# Patient Record
Sex: MALE | Race: WHITE | Employment: FULL TIME | URBAN - METROPOLITAN AREA
[De-identification: names, ages, dates, MRNs, and addresses within clinical notes are randomized per-mention and may not be internally consistent; named-entity substitution may affect disease eponyms.]

---

## 2021-03-31 ENCOUNTER — OFFICE VISIT (OUTPATIENT)
Dept: FAMILY MEDICINE CLINIC | Age: 56
End: 2021-03-31

## 2021-03-31 VITALS
SYSTOLIC BLOOD PRESSURE: 150 MMHG | BODY MASS INDEX: 26.24 KG/M2 | TEMPERATURE: 98.8 F | WEIGHT: 198 LBS | HEIGHT: 73 IN | DIASTOLIC BLOOD PRESSURE: 110 MMHG | OXYGEN SATURATION: 96 % | HEART RATE: 91 BPM

## 2021-03-31 VITALS
HEART RATE: 89 BPM | TEMPERATURE: 97.1 F | HEIGHT: 73 IN | OXYGEN SATURATION: 97 % | DIASTOLIC BLOOD PRESSURE: 100 MMHG | WEIGHT: 192.38 LBS | BODY MASS INDEX: 25.5 KG/M2 | RESPIRATION RATE: 16 BRPM | SYSTOLIC BLOOD PRESSURE: 150 MMHG

## 2021-03-31 DIAGNOSIS — F17.210 TOBACCO DEPENDENCE DUE TO CIGARETTES: ICD-10-CM

## 2021-03-31 DIAGNOSIS — Z13.31 POSITIVE DEPRESSION SCREENING: ICD-10-CM

## 2021-03-31 DIAGNOSIS — Z53.20 PROCEDURE REFUSED: ICD-10-CM

## 2021-03-31 DIAGNOSIS — Z28.20 VACCINE REFUSED BY PATIENT: ICD-10-CM

## 2021-03-31 DIAGNOSIS — I10 ESSENTIAL HYPERTENSION: Primary | ICD-10-CM

## 2021-03-31 DIAGNOSIS — E66.3 OVERWEIGHT: ICD-10-CM

## 2021-03-31 DIAGNOSIS — F32.A ANXIETY AND DEPRESSION: ICD-10-CM

## 2021-03-31 DIAGNOSIS — F41.9 ANXIETY AND DEPRESSION: ICD-10-CM

## 2021-03-31 DIAGNOSIS — Z59.00 HOMELESS: ICD-10-CM

## 2021-03-31 DIAGNOSIS — Z53.20 COLON CANCER SCREENING DECLINED: ICD-10-CM

## 2021-03-31 PROCEDURE — 99213 OFFICE O/P EST LOW 20 MIN: CPT | Performed by: NURSE PRACTITIONER

## 2021-03-31 RX ORDER — VENLAFAXINE HYDROCHLORIDE 150 MG/1
150 CAPSULE, EXTENDED RELEASE ORAL DAILY
COMMUNITY

## 2021-03-31 RX ORDER — ALPRAZOLAM 0.25 MG/1
0.25 TABLET ORAL
COMMUNITY

## 2021-03-31 SDOH — ECONOMIC STABILITY - HOUSING INSECURITY: HOMELESSNESS UNSPECIFIED: Z59.00

## 2021-03-31 NOTE — PATIENT INSTRUCTIONS
[address] [facility] 900 [facility] Bita Ladd 
[phone] [address]- distribution on Fridays at 8451 [facility] location [address] 
[phone]

## 2021-03-31 NOTE — PROGRESS NOTES
Subjective  Chief Complaint   Patient presents with    Follow Up Chronic Condition     HTN     HPI:  Ragini Britt is a 54 y.o. male. Patient presents for management of HTN. Remains without insurance and declines all labs, screening, and immunizations. Currently unemployed and living in a hotel. Eating mostly fast food and salad from [employer]. Has been working to cut back on tobacco use. Currently smoking 1PPW. Follows with psychiatry for management of anxiety and depression. Last visit one week ago via phone to discuss ongoing depression. Management of HTN-  Current meds: Metoprolol 50mg  Home BP readings: does not check regularly, has a cuff in storage  High salt intake: yes  Stays hydrated: working to increase intake  Regular exercise: no  Denies lightheadedness, dizziness, headaches, chest pain, palpitations, lower extremity edema, and calf pain with exertion.     Past Medical History:   Diagnosis Date    Anxiety     Depression     Essential hypertension      Family History   Problem Relation Age of Onset    Alzheimer Mother     Diabetes Father     Hypertension Father      Social History     Socioeconomic History    Marital status: SINGLE     Spouse name: Not on file    Number of children: Not on file    Years of education: Not on file    Highest education level: Not on file   Occupational History    Not on file   Social Needs    Financial resource strain: Not on file    Food insecurity     Worry: Not on file     Inability: Not on file    Transportation needs     Medical: Not on file     Non-medical: Not on file   Tobacco Use    Smoking status: Current Every Day Smoker     Years: 45.00    Smokeless tobacco: Never Used    Tobacco comment: Started age 8- 1 pack every 3 days at max, currently 1PPW   Substance and Sexual Activity    Alcohol use: Not Currently     Frequency: Never    Drug use: Not Currently    Sexual activity: Not on file   Lifestyle    Physical activity Days per week: Not on file     Minutes per session: Not on file    Stress: Not on file   Relationships    Social connections     Talks on phone: Not on file     Gets together: Not on file     Attends Protestant service: Not on file     Active member of club or organization: Not on file     Attends meetings of clubs or organizations: Not on file     Relationship status: Not on file    Intimate partner violence     Fear of current or ex partner: Not on file     Emotionally abused: Not on file     Physically abused: Not on file     Forced sexual activity: Not on file   Other Topics Concern    Not on file   Social History Narrative    Not on file     Current Outpatient Medications on File Prior to Visit   Medication Sig Dispense Refill    venlafaxine-SR (EFFEXOR-XR) 150 mg capsule Take 150 mg by mouth daily.  ALPRAZolam (XANAX) 0.25 mg tablet Take 0.25 mg by mouth nightly.  metoprolol tartrate (LOPRESSOR) 50 mg tablet TAKE ONE TABLET BY MOUTH TWICE A  Tab 0     No current facility-administered medications on file prior to visit. No Known Allergies  ROS  See HPI for pertinent ROS. Objective  Physical Exam  Vitals signs and nursing note reviewed. Constitutional:       General: He is not in acute distress. Appearance: Normal appearance. He is overweight. HENT:      Head: Normocephalic. Eyes:      Extraocular Movements: Extraocular movements intact. Cardiovascular:      Rate and Rhythm: Normal rate and regular rhythm. Heart sounds: Normal heart sounds. Pulmonary:      Effort: Pulmonary effort is normal.      Breath sounds: Normal breath sounds. Musculoskeletal: Normal range of motion. Right lower leg: No edema. Left lower leg: No edema. Skin:     General: Skin is warm and dry. Neurological:      Mental Status: He is alert and oriented to person, place, and time.    Psychiatric:         Mood and Affect: Mood normal.         Behavior: Behavior normal. Assessment & Plan      ICD-10-CM ICD-9-CM    1. Essential hypertension  I10 401.9    2. Overweight  E66.3 278.02    3. Tobacco dependence due to cigarettes  F17.210 305.1    4. Anxiety and depression  F41.9 300.00     F32.9 311    5. Positive depression screening  Z13.31 796.4    6. Colon cancer screening declined  Z53.20 V64.2    7. Vaccine refused by patient  Z28.20 V64.09    8. Procedure refused  Z53.29 V64.2    9. Homeless  Z59.0 V60.0      Diagnoses and all orders for this visit:    1. Essential hypertension  BP is above goal in the office today. Discussed the importance of home BP monitoring. Check BP readings 1 to 2 hours after taking medication and after sitting quietly for 5 minutes with both feet flat on the floor and arm at heart level. Call if readings are consistently greater than 140/90 on either number. Increase regular exercise, limit salt intake, and stay well-hydrated. 2. Overweight  Increase regular exercise and eat a healthy diet. Eliminate fast food as much as possible. 3. Tobacco dependence due to cigarettes  Praised for cutting back on cigarette use. Call if we can be of further assistance. 4. Anxiety and depression  Follows with psychiatry for management. Patient declined medication change at appointment last week. 5. Positive depression screening  As above. 6. Colon cancer screening declined  Declines colon cancer screening due to lack of insurance. Understands the risk without screening. 7. Vaccine refused by patient  Declines all vaccines due to lack of insurance. 8. Procedure refused  Declines all lab testing due to lack of insurance. Understands the risk of not having labs checked especially with hypertension. 5. Homeless  Currently residing in a hotel. Unsure what he will do when he can no longer pay. [address] [facility] [employer] information provided to patient.     Follow-up and Dispositions    · Return in about 6 months (around 9/30/2021) for follow up, hypertension, nonfasting.            Toi Hall NP

## 2021-09-20 ENCOUNTER — TELEPHONE (OUTPATIENT)
Dept: FAMILY MEDICINE CLINIC | Age: 56
End: 2021-09-20

## 2021-09-20 RX ORDER — METOPROLOL TARTRATE 50 MG/1
50 TABLET ORAL 2 TIMES DAILY
Qty: 180 TABLET | Refills: 1 | Status: SHIPPED | OUTPATIENT
Start: 2021-09-20 | End: 2022-03-09 | Stop reason: DRUGHIGH

## 2021-09-20 NOTE — TELEPHONE ENCOUNTER
Pt came in stating that he needs a refill on:  1) metoprolol   **pt has an appt scheduled for 9/22/21**

## 2021-09-22 ENCOUNTER — OFFICE VISIT (OUTPATIENT)
Dept: FAMILY MEDICINE CLINIC | Age: 56
End: 2021-09-22

## 2021-09-22 VITALS
SYSTOLIC BLOOD PRESSURE: 138 MMHG | TEMPERATURE: 97.5 F | HEIGHT: 73 IN | WEIGHT: 188.38 LBS | OXYGEN SATURATION: 97 % | RESPIRATION RATE: 16 BRPM | HEART RATE: 68 BPM | DIASTOLIC BLOOD PRESSURE: 94 MMHG | BODY MASS INDEX: 24.97 KG/M2

## 2021-09-22 DIAGNOSIS — F17.210 TOBACCO DEPENDENCE DUE TO CIGARETTES: ICD-10-CM

## 2021-09-22 DIAGNOSIS — I10 ESSENTIAL HYPERTENSION: Primary | ICD-10-CM

## 2021-09-22 DIAGNOSIS — Z53.20 PROCEDURE REFUSED: ICD-10-CM

## 2021-09-22 PROCEDURE — 99213 OFFICE O/P EST LOW 20 MIN: CPT | Performed by: NURSE PRACTITIONER

## 2021-09-22 NOTE — PROGRESS NOTES
Chief Complaint   Patient presents with    Follow-up     HTN   1. Have you been to the ER, urgent care clinic since your last visit? Hospitalized since your last visit? No    2. Have you seen or consulted any other health care providers outside of the [address] [place] [address] since your last visit? Include any pap smears or colon screening.  No

## 2021-09-22 NOTE — PROGRESS NOTES
Subjective  Chief Complaint   Patient presents with    Follow-up     HTN     HPI:  Shiela Rouse is a 64 y.o. male. Patient presents for management of HTN. Remains without insurance and declines all labs, screening, and immunizations. Currently employed at [employer] and living with a cousin. Continues to eat mostly fast food but trying to eat more salad and fruit. Has increased tobacco use, now smoking 1 pack every 3 days. He is uninterested and unmotivated to quit. Follows with psychiatry for management of anxiety and depression. Last visit 1 1/2 weeks ago. Management of HTN-  Current meds: Metoprolol 50mg BID  Home BP readings: can't find cuff, can't afford new cuff at this time  High salt intake: yes  Stays hydrated: no  Regular exercise: stays active at work  Denies lightheadedness, dizziness, headaches, chest pain, palpitations, lower extremity edema, and calf pain with exertion.       Past Medical History:   Diagnosis Date    Anxiety     Depression     Essential hypertension      Family History   Problem Relation Age of Onset    Alzheimer Mother     Diabetes Father     Hypertension Father      Social History     Socioeconomic History    Marital status: SINGLE     Spouse name: Not on file    Number of children: Not on file    Years of education: Not on file    Highest education level: Not on file   Occupational History    Not on file   Tobacco Use    Smoking status: Current Every Day Smoker     Years: 37.1    Smokeless tobacco: Never Used    Tobacco comment: Started age 8- 1 pack every 3 days at max, currently 1PPW   Vaping Use    Vaping Use: Never used   Substance and Sexual Activity    Alcohol use: Not Currently    Drug use: Not Currently    Sexual activity: Not on file   Other Topics Concern    Not on file   Social History Narrative    Not on file     Social Determinants of Health     Financial Resource Strain:     Difficulty of Paying Living Expenses: Food Insecurity:     Worried About Running Out of Food in the Last Year:     [address] in the Last Year:    Transportation Needs:     Lack of Transportation (Medical):  Lack of Transportation (Non-Medical):    Physical Activity:     Days of Exercise per Week:     Minutes of Exercise per Session:    Stress:     Feeling of Stress :    Social Connections:     Frequency of Communication with Friends and Family:     Frequency of Social Gatherings with Friends and Family:     Attends Moravian Services:     Active Member of Clubs or Organizations:     Attends Club or Organization Meetings:     Marital Status:    Intimate Partner Violence:     Fear of Current or Ex-Partner:     Emotionally Abused:     Physically Abused:     Sexually Abused:      Current Outpatient Medications on File Prior to Visit   Medication Sig Dispense Refill    metoprolol tartrate (LOPRESSOR) 50 mg tablet Take 1 Tablet by mouth two (2) times a day. 180 Tablet 1    venlafaxine-SR (EFFEXOR-XR) 150 mg capsule Take 150 mg by mouth daily.  ALPRAZolam (XANAX) 0.25 mg tablet Take 0.25 mg by mouth nightly. No current facility-administered medications on file prior to visit. No Known Allergies  ROS  See HPI for pertinent ROS. Objective  Visit Vitals  BP (!) 138/94 (BP 1 Location: Left upper arm, BP Patient Position: Sitting)   Pulse 68   Temp 97.5 °F (36.4 °C) (Temporal)   Resp 16   Ht 6' 1\" (1.854 m)   Wt 188 lb 6 oz (85.4 kg)   SpO2 97%   BMI 24.85 kg/m²       Physical Exam  Vitals and nursing note reviewed. Constitutional:       General: He is not in acute distress. Appearance: Normal appearance. He is normal weight. HENT:      Head: Normocephalic. Eyes:      Extraocular Movements: Extraocular movements intact. Cardiovascular:      Rate and Rhythm: Normal rate and regular rhythm. Heart sounds: Normal heart sounds.    Pulmonary:      Effort: Pulmonary effort is normal.      Breath sounds: Normal breath sounds. Musculoskeletal:         General: Normal range of motion. Right lower leg: No edema. Left lower leg: No edema. Skin:     General: Skin is warm and dry. Neurological:      Mental Status: He is alert and oriented to person, place, and time. Psychiatric:         Mood and Affect: Mood normal.         Behavior: Behavior normal.          Assessment & Plan      ICD-10-CM ICD-9-CM    1. Essential hypertension  I10 401.9    2. Tobacco dependence due to cigarettes  F17.210 305.1    3. Procedure refused  Z53.29 V64.2    4. BMI 24.0-24.9, adult  Z68.24 V85.1      Diagnoses and all orders for this visit:    1. Essential hypertension  BP is above goal in the office today on 2 readings. Reinforced the importance of home BP monitoring. Purchase a new cuff and check readings 1-2 times per week and call if consistently higher than 140 over 90 on either number. Limit salt, stay hydrated, and increase regular exercise. 2. Tobacco dependence due to cigarettes  He is aware of the positive benefits of smoking cessation and remains uninterested and unmotivated to quit at this time. 3. Procedure refused  Declines all labs, vaccines, and prostate and colon cancer screening. 4. BMI 24.0-24.9, adult  Weight remains stable. Follow-up and Dispositions    · Return in about 6 months (around 3/22/2022) for follow up, hypertension, nonfasting.            Ish Encarnacion NP

## 2022-03-09 ENCOUNTER — OFFICE VISIT (OUTPATIENT)
Dept: FAMILY MEDICINE CLINIC | Age: 57
End: 2022-03-09
Payer: COMMERCIAL

## 2022-03-09 VITALS
DIASTOLIC BLOOD PRESSURE: 98 MMHG | RESPIRATION RATE: 16 BRPM | OXYGEN SATURATION: 98 % | WEIGHT: 182 LBS | TEMPERATURE: 97.3 F | BODY MASS INDEX: 24.12 KG/M2 | HEART RATE: 78 BPM | HEIGHT: 73 IN | SYSTOLIC BLOOD PRESSURE: 152 MMHG

## 2022-03-09 DIAGNOSIS — F17.210 TOBACCO DEPENDENCE DUE TO CIGARETTES: ICD-10-CM

## 2022-03-09 DIAGNOSIS — Z11.59 ENCOUNTER FOR HEPATITIS C SCREENING TEST FOR LOW RISK PATIENT: ICD-10-CM

## 2022-03-09 DIAGNOSIS — Z23 ENCOUNTER FOR IMMUNIZATION: ICD-10-CM

## 2022-03-09 DIAGNOSIS — Z12.5 SCREENING FOR MALIGNANT NEOPLASM OF PROSTATE: ICD-10-CM

## 2022-03-09 DIAGNOSIS — Z12.11 COLON CANCER SCREENING: ICD-10-CM

## 2022-03-09 DIAGNOSIS — I10 ESSENTIAL HYPERTENSION: Primary | ICD-10-CM

## 2022-03-09 DIAGNOSIS — Z28.20 VACCINE REFUSED BY PATIENT: ICD-10-CM

## 2022-03-09 PROCEDURE — 99214 OFFICE O/P EST MOD 30 MIN: CPT | Performed by: NURSE PRACTITIONER

## 2022-03-09 RX ORDER — LISINOPRIL 20 MG/1
20 TABLET ORAL DAILY
Qty: 30 TABLET | Refills: 0 | Status: SHIPPED | OUTPATIENT
Start: 2022-03-09 | End: 2022-04-06 | Stop reason: SDUPTHER

## 2022-03-09 NOTE — PROGRESS NOTES
Subjective  Chief Complaint   Patient presents with    Follow-up     HTN, med refill     HPI:  Dwight Kim is a 64 y.o. male. Patient presents for management of hypertension. Now has insurance and is agreeable to lab work and preventative care. Also interested in switching BP meds to once daily medication. Unsure what he took in the past, possibly Lisinopril. Currently employed at [employer]. Continues to live with cousin but hoping to get his own apartment sometime over the next few months. Follows with psychiatry for management of anxiety and depression. Management of HTN-  Current meds: Metoprolol 50 mg BID  Home BP readings: 150s/90s  Denies lightheadedness, dizziness, headaches, chest pain, palpitations, and lower extremity edema.     Immunizations:  Flu: due next fall  COVID: received two doses of Pfizer from [facility], booster due  Tetanus: declines, prefers to verify insurance coverage first  Shingrix: declines, prefers to verify insurance coverage first  Pneumovax 23: declines, prefers to verify insurance coverage first    HCV screening: ordered  PSA: ordered  Colon cancer screening: declines  Smoking status: current  Low dose CT scan: not indicated      Past Medical History:   Diagnosis Date    Anxiety     Depression     Essential hypertension      Family History   Problem Relation Age of Onset    Alzheimer's Disease Mother     Diabetes Father     Hypertension Father      Social History     Socioeconomic History    Marital status: SINGLE     Spouse name: Not on file    Number of children: Not on file    Years of education: Not on file    Highest education level: Not on file   Occupational History    Not on file   Tobacco Use    Smoking status: Current Every Day Smoker     Packs/day: 0.25     Years: 56.00     Pack years: 14.00    Smokeless tobacco: Never Used    Tobacco comment: Started age 8- 1 pack every 3 days at max   Vaping Use    Vaping Use: Never used Substance and Sexual Activity    Alcohol use: Not Currently    Drug use: Not Currently    Sexual activity: Not on file   Other Topics Concern    Not on file   Social History Narrative    Not on file     Social Determinants of Health     Financial Resource Strain:     Difficulty of Paying Living Expenses: Not on file   Food Insecurity:     Worried About Running Out of Food in the Last Year: Not on file    [place] of Food in the Last Year: Not on file   Transportation Needs:     Lack of Transportation (Medical): Not on file    Lack of Transportation (Non-Medical): Not on file   Physical Activity:     Days of Exercise per Week: Not on file    Minutes of Exercise per Session: Not on file   Stress:     Feeling of Stress : Not on file   Social Connections:     Frequency of Communication with Friends and Family: Not on file    Frequency of Social Gatherings with Friends and Family: Not on file    Attends Spiritism Services: Not on file    Active Member of [address] [place] [address] or Organizations: Not on file    Attends Club or Organization Meetings: Not on file    Marital Status: Not on file   Intimate Partner Violence:     Fear of Current or Ex-Partner: Not on file    Emotionally Abused: Not on file    Physically Abused: Not on file    Sexually Abused: Not on file   Housing Stability:     Unable to Pay for Housing in the Last Year: Not on file    Number of [place] in the Last Year: Not on file    Unstable Housing in the Last Year: Not on file     Current Outpatient Medications on File Prior to Visit   Medication Sig Dispense Refill    venlafaxine-SR (EFFEXOR-XR) 150 mg capsule Take 150 mg by mouth daily.  ALPRAZolam (XANAX) 0.25 mg tablet Take 0.25 mg by mouth nightly.  [DISCONTINUED] metoprolol tartrate (LOPRESSOR) 50 mg tablet Take 1 Tablet by mouth two (2) times a day. 180 Tablet 1     No current facility-administered medications on file prior to visit.      No Known Allergies  ROS  See HPI for pertinent ROS. Objective  Visit Vitals  BP (!) 152/98 (BP 1 Location: Right upper arm, BP Patient Position: Sitting)   Pulse 78   Temp 97.3 °F (36.3 °C)   Resp 16   Ht 6' 1\" (1.854 m)   Wt 182 lb (82.6 kg)   SpO2 98%   BMI 24.01 kg/m²       Physical Exam  Vitals and nursing note reviewed. Constitutional:       General: He is not in acute distress. Appearance: Normal appearance. He is normal weight. HENT:      Head: Normocephalic. Eyes:      Extraocular Movements: Extraocular movements intact. Cardiovascular:      Rate and Rhythm: Normal rate and regular rhythm. Heart sounds: Normal heart sounds. Pulmonary:      Effort: Pulmonary effort is normal.      Breath sounds: Normal breath sounds. Musculoskeletal:         General: Normal range of motion. Right lower leg: No edema. Left lower leg: No edema. Skin:     General: Skin is warm and dry. Neurological:      Mental Status: He is alert and oriented to person, place, and time. Psychiatric:         Mood and Affect: Mood normal.         Behavior: Behavior normal.          Assessment & Plan      ICD-10-CM ICD-9-CM    1. Essential hypertension  I10 401.9 CBC WITH AUTOMATED DIFF      LIPID PANEL      METABOLIC PANEL, COMPREHENSIVE      lisinopriL (PRINIVIL, ZESTRIL) 20 mg tablet   2. Encounter for hepatitis C screening test for low risk patient  Z11.59 V73.89 HCV AB W/RFLX TO ESTELLE   3. Screening for malignant neoplasm of prostate  Z12.5 V76.44 PSA, DIAGNOSTIC (PROSTATE SPECIFIC AG)   4. BMI 24.0-24.9, adult  Z68.24 V85.1    5. Encounter for immunization  Z23 V03.89    6. Vaccine refused by patient  Z28.20 V64.09    7. Colon cancer screening  Z12.11 V76.51 REFERRAL TO GASTROENTEROLOGY   8. Tobacco dependence due to cigarettes  F17.210 305.1      Diagnoses and all orders for this visit:    1. Essential hypertension  BP is above goal in the office today and on reported home readings. Stop metoprolol and start lisinopril ordered.  Check BP readings daily and 1 to 2 hours after taking medication and after sitting quietly for 5 minutes with both feet flat on the floor and arm at heart level. Bring BP log to f/u appointment or call sooner if readings are consistently greater than 140/90 on either number. Increase regular exercise, limit salt intake, and stay well-hydrated. -     CBC WITH AUTOMATED DIFF  -     LIPID PANEL  -     METABOLIC PANEL, COMPREHENSIVE  -     lisinopriL (PRINIVIL, ZESTRIL) 20 mg tablet; Take 1 Tablet by mouth daily. 2. Encounter for hepatitis C screening test for low risk patient  -     HCV AB W/RFLX TO ESTELLE    3. Screening for malignant neoplasm of prostate  -     PSA, DIAGNOSTIC (PROSTATE SPECIFIC AG)    4. BMI 24.0-24.9, adult  Weight is down 10 pounds over the past year. Will continue to monitor. Encouraged regular exercise and healthy diet. 5. Encounter for immunization  Reminded to receive COVID booster from pharmacy. 6. Vaccine refused by patient  Declines tetanus, Shingrix, and Pneumovax today. Advised vaccines are typically covered by insurance. However, he continues to prefer to verify insurance coverage first.    7. Colon cancer screening  Overdue for initial screening.  -     REFERRAL TO GASTROENTEROLOGY    8. Tobacco dependence due to cigarettes  The patient was counseled on the dangers of tobacco use, and was advised to quit and reluctant to quit. Reviewed strategies to maximize success, including written materials. Follow-up and Dispositions    · Return in about 1 month (around 4/9/2022) for follow up, hypertension, nonfasting, office visit (vaccines).            Elizabeth Schaefer NP

## 2022-03-09 NOTE — PATIENT INSTRUCTIONS
Verify insurance coverage for Tdap, Shingrix, ,Pneumovax 23 and Pneumovax 20. Stopping Smoking: Care Instructions  Your Care Instructions     Cigarette smokers crave the nicotine in cigarettes. Giving it up is much harder than simply changing a habit. Your body has to stop craving the nicotine. It is hard to quit, but you can do it. There are many tools that people use to quit smoking. You may find that combining tools works best for you. There are several steps to quitting. First you get ready to quit. Then you get support to help you. After that, you learn new skills and behaviors to become a nonsmoker. For many people, a necessary step is getting and using medicine. Your doctor will help you set up the plan that best meets your needs. You may want to attend a smoking cessation program to help you quit smoking. When you choose a program, look for one that has proven success. Ask your doctor for ideas. You will greatly increase your chances of success if you take medicine as well as get counseling or join a cessation program.  Some of the changes you feel when you first quit tobacco are uncomfortable. Your body will miss the nicotine at first, and you may feel short-tempered and grumpy. You may have trouble sleeping or concentrating. Medicine can help you deal with these symptoms. You may struggle with changing your smoking habits and rituals. The last step is the tricky one: Be prepared for the smoking urge to continue for a time. This is a lot to deal with, but keep at it. You will feel better. Follow-up care is a key part of your treatment and safety. Be sure to make and go to all appointments, and call your doctor if you are having problems. It's also a good idea to know your test results and keep a list of the medicines you take. How can you care for yourself at home? · Ask your family, friends, and coworkers for support. You have a better chance of quitting if you have help and support.   · Join a support group, such as Nicotine Anonymous, for people who are trying to quit smoking. · Consider signing up for a smoking cessation program, such as the American Lung Association's Freedom from Smoking program.  · Get text messaging support. Go to the website at www.smokefree. gov to sign up for the [facility] program.  · Set a quit date. Pick your date carefully so that it is not right in the middle of a big deadline or stressful time. Once you quit, do not even take a puff. Get rid of all ashtrays and lighters after your last cigarette. Clean your house and your clothes so that they do not smell of smoke. · Learn how to be a nonsmoker. Think about ways you can avoid those things that make you reach for a cigarette. ? Avoid situations that put you at greatest risk for smoking. For some people, it is hard to have a drink with friends without smoking. For others, they might skip a coffee break with coworkers who smoke. ? Change your daily routine. Take a different route to work or eat a meal in a different place. · Cut down on stress. Calm yourself or release tension by doing an activity you enjoy, such as reading a book, taking a hot bath, or gardening. · Talk to your doctor or pharmacist about nicotine replacement therapy, which replaces the nicotine in your body. You still get nicotine but you do not use tobacco. Nicotine replacement products help you slowly reduce the amount of nicotine you need. These products come in several forms, many of them available over-the-counter:  ? Nicotine patches  ? Nicotine gum and lozenges  ? Nicotine inhaler  · Ask your doctor about bupropion (Wellbutrin) or varenicline (Chantix), which are prescription medicines. They do not contain nicotine. They help you by reducing withdrawal symptoms, such as stress and anxiety. · Some people find hypnosis, acupuncture, and massage helpful for ending the smoking habit. · Eat a healthy diet and get regular exercise.  Having healthy habits will help your body move past its craving for nicotine. · Be prepared to keep trying. Most people are not successful the first few times they try to quit. Do not get mad at yourself if you smoke again. Make a list of things you learned and think about when you want to try again, such as next week, next month, or next year. Where can you learn more? Go to http://www.gray.com/  Enter [id number] in the search box to learn more about \"Stopping Smoking: Care Instructions. \"  Current as of: October 28, 2021               Content Version: 13.2  © 4797-9989 [employer]. Care instructions adapted under license by [employer] (which disclaims liability or warranty for this information). If you have questions about a medical condition or this instruction, always ask your healthcare professional. [address] any warranty or liability for your use of this information. Learning About Benefits From Quitting Smoking  How does quitting smoking make you healthier? If you're thinking about quitting smoking, you may have a few reasons to be smoke-free. Your health may be one of them. · When you quit smoking, you lower your risks for cancer, lung disease, heart attack, stroke, blood vessel disease, and blindness from macular degeneration. · When you're smoke-free, you get sick less often, and you heal faster. You are less likely to get colds, flu, bronchitis, and pneumonia. · As a nonsmoker, you may find that your mood is better and you are less stressed. When and how will you feel healthier? Quitting has real health benefits that start from day 1 of being smoke-free. And the longer you stay smoke-free, the healthier you get and the better you feel. The first hours  · After just 20 minutes, your blood pressure and heart rate go down. That means there's less stress on your heart and blood vessels.   · Within 12 hours, the level of carbon monoxide in your blood drops back to normal. That makes room for more oxygen. With more oxygen in your body, you may notice that you have more energy than when you smoked. After 2 weeks  · Your lungs start to work better. · Your risk of heart attack starts to drop. After 1 month  · When your lungs are clear, you cough less and breathe deeper, so it's easier to be active. · Your sense of taste and smell return. That means you can enjoy food more than you have since you started smoking. Over the years  · Over the years, your risks of heart disease, heart attack, and stroke are lower. · After 10 years, your risk of dying from lung cancer is cut by about half. And your risk for many other types of cancer is lower too. How would quitting help others in your life? When you quit smoking, you improve the health of everyone who now breathes in your smoke. · Their heart, lung, and cancer risks drop, much like yours. · They are sick less. For babies and small children, living smoke-free means they're less likely to have ear infections, pneumonia, and bronchitis. · If you're a woman who is or will be pregnant someday, quitting smoking means a healthier . · Children who are close to you are less likely to become adult smokers. Where can you learn more? Go to http://www.gray.com/  Enter O319 in the search box to learn more about \"Learning About Benefits From Quitting Smoking. \"  Current as of: 2021               Content Version: 13.2   [employer]. Care instructions adapted under license by [employer] (which disclaims liability or warranty for this information). If you have questions about a medical condition or this instruction, always ask your healthcare professional. [place] [address] any warranty or liability for your use of this information.

## 2022-03-09 NOTE — PROGRESS NOTES
Chief Complaint   Patient presents with    Follow-up     HTN, med refill   1. Have you been to the ER, urgent care clinic since your last visit? Hospitalized since your last visit? No    2. Have you seen or consulted any other health care providers outside of the [address] [place] [address] since your last visit? Include any pap smears or colon screening.  No   3 most recent PHQ Screens 3/9/2022   Little interest or pleasure in doing things Not at all   Feeling down, depressed, irritable, or hopeless Not at all   Total Score PHQ 2 0   Trouble falling or staying asleep, or sleeping too much -   Feeling tired or having little energy -   Poor appetite, weight loss, or overeating -   Feeling bad about yourself - or that you are a failure or have let yourself or your family down -   Trouble concentrating on things such as school, work, reading, or watching TV -   Moving or speaking so slowly that other people could have noticed; or the opposite being so fidgety that others notice -   Thoughts of being better off dead, or hurting yourself in some way -   PHQ 9 Score -   How difficult have these problems made it for you to do your work, take care of your home and get along with others -     Visit Vitals  BP (!) 152/98 (BP 1 Location: Right upper arm, BP Patient Position: Sitting)   Pulse 78   Temp 97.3 °F (36.3 °C)   Resp 16   Ht 6' 1\" (1.854 m)   Wt 182 lb (82.6 kg)   SpO2 98%   BMI 24.01 kg/m²

## 2022-03-10 LAB
ALBUMIN SERPL-MCNC: 4 G/DL (ref 3.8–4.9)
ALBUMIN/GLOB SERPL: 1.5 {RATIO} (ref 1.2–2.2)
ALP SERPL-CCNC: 109 IU/L (ref 44–121)
ALT SERPL-CCNC: 19 IU/L (ref 0–44)
AST SERPL-CCNC: 24 IU/L (ref 0–40)
BASOPHILS # BLD AUTO: 0.1 X10E3/UL (ref 0–0.2)
BASOPHILS NFR BLD AUTO: 1 %
BILIRUB SERPL-MCNC: 0.8 MG/DL (ref 0–1.2)
BUN SERPL-MCNC: 12 MG/DL (ref 6–24)
BUN/CREAT SERPL: 14 (ref 9–20)
CALCIUM SERPL-MCNC: 9.3 MG/DL (ref 8.7–10.2)
CHLORIDE SERPL-SCNC: 103 MMOL/L (ref 96–106)
CHOLEST SERPL-MCNC: 210 MG/DL (ref 100–199)
CO2 SERPL-SCNC: 23 MMOL/L (ref 20–29)
CREAT SERPL-MCNC: 0.84 MG/DL (ref 0.76–1.27)
EGFR: 102 ML/MIN/1.73
EOSINOPHIL # BLD AUTO: 0.2 X10E3/UL (ref 0–0.4)
EOSINOPHIL NFR BLD AUTO: 3 %
ERYTHROCYTE [DISTWIDTH] IN BLOOD BY AUTOMATED COUNT: 13 % (ref 11.6–15.4)
GLOBULIN SER CALC-MCNC: 2.6 G/DL (ref 1.5–4.5)
GLUCOSE SERPL-MCNC: 95 MG/DL (ref 65–99)
HCT VFR BLD AUTO: 48.5 % (ref 37.5–51)
HCV AB S/CO SERPL IA: <0.1 S/CO RATIO (ref 0–0.9)
HCV AB SERPL QL IA: NORMAL
HDLC SERPL-MCNC: 68 MG/DL
HGB BLD-MCNC: 16.4 G/DL (ref 13–17.7)
IMM GRANULOCYTES # BLD AUTO: 0 X10E3/UL (ref 0–0.1)
IMM GRANULOCYTES NFR BLD AUTO: 0 %
LDLC SERPL CALC-MCNC: 131 MG/DL (ref 0–99)
LYMPHOCYTES # BLD AUTO: 2.9 X10E3/UL (ref 0.7–3.1)
LYMPHOCYTES NFR BLD AUTO: 36 %
MCH RBC QN AUTO: 32.1 PG (ref 26.6–33)
MCHC RBC AUTO-ENTMCNC: 33.8 G/DL (ref 31.5–35.7)
MCV RBC AUTO: 95 FL (ref 79–97)
MONOCYTES # BLD AUTO: 1 X10E3/UL (ref 0.1–0.9)
MONOCYTES NFR BLD AUTO: 12 %
NEUTROPHILS # BLD AUTO: 4 X10E3/UL (ref 1.4–7)
NEUTROPHILS NFR BLD AUTO: 48 %
PLATELET # BLD AUTO: 223 X10E3/UL (ref 150–450)
POTASSIUM SERPL-SCNC: 4.2 MMOL/L (ref 3.5–5.2)
PROT SERPL-MCNC: 6.6 G/DL (ref 6–8.5)
PSA SERPL-MCNC: 1.4 NG/ML (ref 0–4)
RBC # BLD AUTO: 5.11 X10E6/UL (ref 4.14–5.8)
SODIUM SERPL-SCNC: 140 MMOL/L (ref 134–144)
TRIGL SERPL-MCNC: 62 MG/DL (ref 0–149)
VLDLC SERPL CALC-MCNC: 11 MG/DL (ref 5–40)
WBC # BLD AUTO: 8.2 X10E3/UL (ref 3.4–10.8)

## 2022-03-10 NOTE — PROGRESS NOTES
No blood cell abnormalities including anemia. Normal glucose, kidney and liver function. PSA level (prostate cancer screening) is within normal limits. Your one time hepatitis C screening is negative. Total and LDL, or bad, cholesterol are elevated and a statin medication is recommended at this time. Please let me know if he is agreeable to this and I will send medication to his pharmacy. I would also recommend regular exercise and low-fat/cholesterol intake.           The 10-year ASCVD risk score (Ronan Vega, et al., 2013) is: 14%

## 2022-03-14 RX ORDER — ATORVASTATIN CALCIUM 10 MG/1
10 TABLET, FILM COATED ORAL DAILY
Qty: 90 TABLET | Refills: 0 | Status: SHIPPED | OUTPATIENT
Start: 2022-03-14 | End: 2022-06-19

## 2022-03-14 NOTE — PROGRESS NOTES
Results reviewed with pt. He stated that he is agreeable to starting the medication and to please send to [facility]'s pharmacy.

## 2022-04-06 DIAGNOSIS — I10 ESSENTIAL HYPERTENSION: ICD-10-CM

## 2022-04-06 RX ORDER — LISINOPRIL 20 MG/1
20 TABLET ORAL DAILY
Qty: 30 TABLET | Refills: 0 | Status: SHIPPED | OUTPATIENT
Start: 2022-04-06 | End: 2022-04-27 | Stop reason: SDUPTHER

## 2022-04-06 NOTE — TELEPHONE ENCOUNTER
----- Message from Aliza Mendez sent at 4/6/2022 10:52 AM EDT -----  Subject: Refill Request    QUESTIONS  Name of Medication? lisinopriL (PRINIVIL, ZESTRIL) 20 mg tablet  Patient-reported dosage and instructions? 20 MG one tablet daily  How many days do you have left? 2  Preferred Pharmacy? [facility] phone number (if available)? [phone]  Additional Information for Provider? PT follow up apt is not until   4/13/2022 and he will be out of BP medication prior to that apt and needs   a refill to either get him to his next apt or a full refill.   ---------------------------------------------------------------------------  --------------  CALL BACK INFO  What is the best way for the office to contact you? OK to leave message on   voicemail  Preferred Call Back Phone Number? [phone]  ---------------------------------------------------------------------------  --------------  SCRIPT ANSWERS  Relationship to Patient?  Self

## 2022-04-13 ENCOUNTER — OFFICE VISIT (OUTPATIENT)
Dept: FAMILY MEDICINE CLINIC | Age: 57
End: 2022-04-13
Payer: COMMERCIAL

## 2022-04-13 VITALS
OXYGEN SATURATION: 97 % | HEIGHT: 73 IN | HEART RATE: 100 BPM | SYSTOLIC BLOOD PRESSURE: 148 MMHG | BODY MASS INDEX: 23.51 KG/M2 | RESPIRATION RATE: 16 BRPM | DIASTOLIC BLOOD PRESSURE: 94 MMHG | WEIGHT: 177.38 LBS | TEMPERATURE: 97.1 F

## 2022-04-13 DIAGNOSIS — R06.83 SNORING: ICD-10-CM

## 2022-04-13 DIAGNOSIS — F17.210 TOBACCO DEPENDENCE DUE TO CIGARETTES: ICD-10-CM

## 2022-04-13 DIAGNOSIS — E78.00 PURE HYPERCHOLESTEROLEMIA: ICD-10-CM

## 2022-04-13 DIAGNOSIS — Z23 ENCOUNTER FOR IMMUNIZATION: ICD-10-CM

## 2022-04-13 DIAGNOSIS — I10 ESSENTIAL HYPERTENSION: Primary | ICD-10-CM

## 2022-04-13 DIAGNOSIS — Z12.11 COLON CANCER SCREENING: ICD-10-CM

## 2022-04-13 PROCEDURE — 99214 OFFICE O/P EST MOD 30 MIN: CPT | Performed by: NURSE PRACTITIONER

## 2022-04-13 RX ORDER — AMLODIPINE BESYLATE 5 MG/1
5 TABLET ORAL DAILY
Qty: 30 TABLET | Refills: 0 | Status: SHIPPED | OUTPATIENT
Start: 2022-04-13 | End: 2022-05-17 | Stop reason: SDUPTHER

## 2022-04-13 NOTE — PROGRESS NOTES
Chief Complaint   Patient presents with    Follow-up     HTN   1. Have you been to the ER, urgent care clinic since your last visit? Hospitalized since your last visit? No    2. Have you seen or consulted any other health care providers outside of the [address] [place] [address] since your last visit? Include any pap smears or colon screening.  No   Visit Vitals  BP (!) 148/94 (BP 1 Location: Left upper arm, BP Patient Position: Sitting)   Pulse 100   Temp 97.1 °F (36.2 °C)   Resp 16   Ht 6' 1\" (1.854 m)   Wt 177 lb 6 oz (80.5 kg)   SpO2 97%   BMI 23.40 kg/m²     3 most recent PHQ Screens 3/9/2022   Little interest or pleasure in doing things Not at all   Feeling down, depressed, irritable, or hopeless Not at all   Total Score PHQ 2 0   Trouble falling or staying asleep, or sleeping too much -   Feeling tired or having little energy -   Poor appetite, weight loss, or overeating -   Feeling bad about yourself - or that you are a failure or have let yourself or your family down -   Trouble concentrating on things such as school, work, reading, or watching TV -   Moving or speaking so slowly that other people could have noticed; or the opposite being so fidgety that others notice -   Thoughts of being better off dead, or hurting yourself in some way -   PHQ 9 Score -   How difficult have these problems made it for you to do your work, take care of your home and get along with others -

## 2022-04-13 NOTE — PROGRESS NOTES
Subjective  Chief Complaint   Patient presents with    Follow-up     HTN     HPI:  Dakotah Lyle is a 64 y.o. male. Patient presents for management of hypertension, HLD, and review of labs. Started and is tolerating atorvastatin daily. Has been told by his roommate he is a very loud snorer. Wondering what can be done for this. Management of HTN-  Current meds: Lisinopril 20 mg daily  Home BP readings: 140s/90s  Denies lightheadedness, dizziness, headaches, chest pain, palpitations, and lower extremity edema. Past Medical History:   Diagnosis Date    Anxiety     Depression     Essential hypertension      Family History   Problem Relation Age of Onset    Alzheimer's Disease Mother     Diabetes Father     Hypertension Father      Social History     Socioeconomic History    Marital status: SINGLE     Spouse name: Not on file    Number of children: Not on file    Years of education: Not on file    Highest education level: Not on file   Occupational History    Not on file   Tobacco Use    Smoking status: Current Every Day Smoker     Packs/day: 0.25     Years: 56.00     Pack years: 14.00    Smokeless tobacco: Never Used    Tobacco comment: Started age 8- 1 pack every 3 days at max   Vaping Use    Vaping Use: Never used   Substance and Sexual Activity    Alcohol use: Not Currently    Drug use: Not Currently    Sexual activity: Not on file   Other Topics Concern    Not on file   Social History Narrative    Not on file     Social Determinants of Health     Financial Resource Strain:     Difficulty of Paying Living Expenses: Not on file   Food Insecurity:     Worried About [address] in the Last Year: Not on file    [place] of Food in the Last Year: Not on file   Transportation Needs:     Lack of Transportation (Medical): Not on file    Lack of Transportation (Non-Medical):  Not on file   Physical Activity:     Days of Exercise per Week: Not on file    Minutes of Exercise per Session: Not on file   Stress:     Feeling of Stress : Not on file   Social Connections:     Frequency of Communication with Friends and Family: Not on file    Frequency of Social Gatherings with Friends and Family: Not on file    Attends Mandaen Services: Not on file    Active Member of [address] [place] [address] or Organizations: Not on file    Attends Club or Organization Meetings: Not on file    Marital Status: Not on file   Intimate Partner Violence:     Fear of Current or Ex-Partner: Not on file    Emotionally Abused: Not on file    Physically Abused: Not on file    Sexually Abused: Not on file   Housing Stability:     Unable to Pay for Housing in the Last Year: Not on file    Number of [place] in the Last Year: Not on file    Unstable Housing in the Last Year: Not on file     Current Outpatient Medications on File Prior to Visit   Medication Sig Dispense Refill    lisinopriL (PRINIVIL, ZESTRIL) 20 mg tablet Take 1 Tablet by mouth daily. 30 Tablet 0    atorvastatin (LIPITOR) 10 mg tablet Take 1 Tablet by mouth daily. 90 Tablet 0    venlafaxine-SR (EFFEXOR-XR) 150 mg capsule Take 150 mg by mouth daily.  ALPRAZolam (XANAX) 0.25 mg tablet Take 0.25 mg by mouth nightly. No current facility-administered medications on file prior to visit. No Known Allergies  ROS  See HPI for pertinent ROS. Objective  Visit Vitals  BP (!) 148/94 (BP 1 Location: Left upper arm, BP Patient Position: Sitting)   Pulse 100   Temp 97.1 °F (36.2 °C)   Resp 16   Ht 6' 1\" (1.854 m)   Wt 177 lb 6 oz (80.5 kg)   SpO2 97%   BMI 23.40 kg/m²       Physical Exam  Vitals and nursing note reviewed. Constitutional:       General: He is not in acute distress. Appearance: Normal appearance. He is normal weight. HENT:      Head: Normocephalic. Eyes:      Extraocular Movements: Extraocular movements intact. Cardiovascular:      Rate and Rhythm: Normal rate and regular rhythm.       Heart sounds: Normal heart sounds. Pulmonary:      Effort: Pulmonary effort is normal.      Breath sounds: Normal breath sounds. Musculoskeletal:         General: Normal range of motion. Right lower leg: No edema. Left lower leg: No edema. Skin:     General: Skin is warm and dry. Neurological:      Mental Status: He is alert and oriented to person, place, and time. Psychiatric:         Mood and Affect: Mood normal.         Behavior: Behavior normal.          Assessment & Plan      ICD-10-CM ICD-9-CM    1. Essential hypertension  I10 401.9 amLODIPine (NORVASC) 5 mg tablet   2. Pure hypercholesterolemia  E78.00 272.0    3. Snoring  R06.83 786.09 SLEEP MEDICINE REFERRAL   4. Tobacco dependence due to cigarettes  F17.210 305.1    5. BMI 23.0-23.9, adult  Z68.23 V85.1    6. Encounter for immunization  Z23 V03.89    7. Colon cancer screening  Z12.11 V76.51      Diagnoses and all orders for this visit:    1. Essential hypertension  BP is above goal in the office today and on reported home readings. Continue lisinopril daily and start amlodipine as ordered. Continue to check BP readings 1 to 2 hours after taking medication and after sitting quietly for 5 minutes with both feet flat on the floor and arm at heart level. Call if readings are consistently greater than 140/90 on either number. Increase regular exercise, limit salt intake, and stay well-hydrated. -     amLODIPine (NORVASC) 5 mg tablet; Take 1 Tablet by mouth daily. 2. Pure hypercholesterolemia  Tolerating atorvastatin daily. We will recheck lipids at follow-up visit in 1 month. 3. Snoring  Sleep medicine referral for further evaluation.  -     SLEEP MEDICINE REFERRAL    4. Tobacco dependence due to cigarettes  Remains uninterested and unmotivated to quit. 5. BMI 23.0-23.9, adult  Weight is down 5 pounds since last visit and 11 pounds over the past 7 months. If weight continues to trend down over the next month, will check TSH with labs.  Call to schedule colon cancer screening. Does not meet criteria for lung cancer screening, appears asymptomatic. 6. Encounter for immunization  He has not verified insurance coverage of vaccines. Reminded to call to verify pneumonia, tetanus, and Shingrix vaccines. Reminded to receive COVID vaccine from pharmacy. 7. Colon cancer screening  Overdue for initial screening. Reminded to schedule. Follow-up and Dispositions    · Return in about 1 month (around 5/13/2022) for follow up, hypertension (ck lipids).            Gaviota Gandhi NP

## 2022-04-27 DIAGNOSIS — I10 ESSENTIAL HYPERTENSION: ICD-10-CM

## 2022-04-27 RX ORDER — LISINOPRIL 20 MG/1
20 TABLET ORAL DAILY
Qty: 30 TABLET | Refills: 0 | Status: SHIPPED | OUTPATIENT
Start: 2022-04-27 | End: 2022-05-17 | Stop reason: SDUPTHER

## 2022-04-27 NOTE — TELEPHONE ENCOUNTER
Pt states as well he has had some blurred vision ( long distance) and sometimes chest pains . Doesn't know if it is stress or from medication.

## 2022-04-27 NOTE — TELEPHONE ENCOUNTER
Unable to reach patient. Voice message states that there are restrictions for that number and ends call.

## 2022-04-27 NOTE — TELEPHONE ENCOUNTER
We can discuss his symptoms at his upcoming visit. If he needs to be seen sooner, please have him change his appointment. And please advise him to seek care at ED for any acute changes or concerns.

## 2022-05-17 DIAGNOSIS — I10 ESSENTIAL HYPERTENSION: ICD-10-CM

## 2022-05-17 RX ORDER — AMLODIPINE BESYLATE 5 MG/1
5 TABLET ORAL DAILY
Qty: 30 TABLET | Refills: 0 | Status: SHIPPED | OUTPATIENT
Start: 2022-05-17 | End: 2022-06-19

## 2022-05-17 RX ORDER — LISINOPRIL 20 MG/1
20 TABLET ORAL DAILY
Qty: 30 TABLET | Refills: 0 | Status: SHIPPED | OUTPATIENT
Start: 2022-05-17 | End: 2022-07-08

## 2022-05-17 NOTE — TELEPHONE ENCOUNTER
R/S pt appt from today due to provider being sick , pt is scheduled for next available and will need 30 day supply of his BP meds .

## 2022-06-18 DIAGNOSIS — I10 ESSENTIAL HYPERTENSION: ICD-10-CM

## 2022-06-19 RX ORDER — ATORVASTATIN CALCIUM 10 MG/1
TABLET, FILM COATED ORAL
Qty: 90 TABLET | Refills: 0 | Status: SHIPPED | OUTPATIENT
Start: 2022-06-19 | End: 2022-09-07

## 2022-06-19 RX ORDER — AMLODIPINE BESYLATE 5 MG/1
TABLET ORAL
Qty: 30 TABLET | Refills: 0 | Status: SHIPPED | OUTPATIENT
Start: 2022-06-19 | End: 2022-06-21 | Stop reason: SDUPTHER

## 2022-06-21 ENCOUNTER — OFFICE VISIT (OUTPATIENT)
Dept: FAMILY MEDICINE CLINIC | Age: 57
End: 2022-06-21

## 2022-06-21 VITALS
RESPIRATION RATE: 16 BRPM | HEART RATE: 90 BPM | SYSTOLIC BLOOD PRESSURE: 144 MMHG | BODY MASS INDEX: 24.29 KG/M2 | OXYGEN SATURATION: 97 % | DIASTOLIC BLOOD PRESSURE: 92 MMHG | HEIGHT: 73 IN | TEMPERATURE: 97.5 F | WEIGHT: 183.25 LBS

## 2022-06-21 DIAGNOSIS — I10 ESSENTIAL HYPERTENSION: Primary | ICD-10-CM

## 2022-06-21 DIAGNOSIS — E78.00 PURE HYPERCHOLESTEROLEMIA: ICD-10-CM

## 2022-06-21 DIAGNOSIS — Z53.20 COLON CANCER SCREENING DECLINED: ICD-10-CM

## 2022-06-21 DIAGNOSIS — Z28.20 VACCINE REFUSED BY PATIENT: ICD-10-CM

## 2022-06-21 PROCEDURE — 99214 OFFICE O/P EST MOD 30 MIN: CPT | Performed by: NURSE PRACTITIONER

## 2022-06-21 RX ORDER — AMLODIPINE BESYLATE 10 MG/1
10 TABLET ORAL DAILY
Qty: 30 TABLET | Refills: 1 | Status: SHIPPED | OUTPATIENT
Start: 2022-06-21 | End: 2022-08-19

## 2022-06-21 NOTE — PROGRESS NOTES
Chief Complaint   Patient presents with    Follow-up     HTN   1. \"Have you been to the ER, urgent care clinic since your last visit? Hospitalized since your last visit? \" No    2. \"Have you seen or consulted any other health care providers outside of the [address] [place] [address] since your last visit? \" No     3. For patients aged 39-70: Has the patient had a colonoscopy / FIT/ Cologuard? No      If the patient is female:    4. For patients aged 41-77: Has the patient had a mammogram within the past 2 years? NA - based on age or sex      11. For patients aged 21-65: Has the patient had a pap smear?  NA - based on age or sex  Visit Vitals  BP (!) 162/98 (BP 1 Location: Left upper arm, BP Patient Position: Sitting)   Pulse 90   Temp 97.5 °F (36.4 °C) (Temporal)   Resp 16   Ht 6' 1\" (1.854 m)   Wt 183 lb 4 oz (83.1 kg)   SpO2 97%   BMI 24.18 kg/m²     3 most recent PHQ Screens 6/21/2022   Little interest or pleasure in doing things Not at all   Feeling down, depressed, irritable, or hopeless Not at all   Total Score PHQ 2 0   Trouble falling or staying asleep, or sleeping too much -   Feeling tired or having little energy -   Poor appetite, weight loss, or overeating -   Feeling bad about yourself - or that you are a failure or have let yourself or your family down -   Trouble concentrating on things such as school, work, reading, or watching TV -   Moving or speaking so slowly that other people could have noticed; or the opposite being so fidgety that others notice -   Thoughts of being better off dead, or hurting yourself in some way -   PHQ 9 Score -   How difficult have these problems made it for you to do your work, take care of your home and get along with others -

## 2022-06-21 NOTE — PROGRESS NOTES
Subjective  Chief Complaint   Patient presents with    Follow-up     HTN     HPI:  Crystal Huynh is a 62 y.o. male. Patient presents for management of hypertension. He is also due to have lipids rechecked. However he is currently without insurance and declines labs today. Management of HTN-  Current meds: Amlodipine 5 mg and lisinopril 20 mg daily  Home BP readings: does to check home BP readings, has a cufff  Denies lightheadedness, dizziness, headaches, chest pain, palpitations, and lower extremity edema. Past Medical History:   Diagnosis Date    Anxiety     Depression     Essential hypertension      Family History   Problem Relation Age of Onset    Alzheimer's Disease Mother     Diabetes Father     Hypertension Father      Social History     Socioeconomic History    Marital status: SINGLE     Spouse name: Not on file    Number of children: Not on file    Years of education: Not on file    Highest education level: Not on file   Occupational History    Not on file   Tobacco Use    Smoking status: Current Every Day Smoker     Packs/day: 0.25     Years: 56.00     Pack years: 14.00    Smokeless tobacco: Never Used    Tobacco comment: Started age 8- 1 pack every 3 days at max   Vaping Use    Vaping Use: Never used   Substance and Sexual Activity    Alcohol use: Not Currently    Drug use: Not Currently    Sexual activity: Not on file   Other Topics Concern    Not on file   Social History Narrative    Not on file     Social Determinants of Health     Financial Resource Strain:     Difficulty of Paying Living Expenses: Not on file   Food Insecurity:     Worried About [address] in the Last Year: Not on file    [place] of Food in the Last Year: Not on file   Transportation Needs:     Lack of Transportation (Medical): Not on file    Lack of Transportation (Non-Medical):  Not on file   Physical Activity:     Days of Exercise per Week: Not on file    Minutes of Exercise per Session: Not on file   Stress:     Feeling of Stress : Not on file   Social Connections:     Frequency of Communication with Friends and Family: Not on file    Frequency of Social Gatherings with Friends and Family: Not on file    Attends Worship Services: Not on file    Active Member of [address] [place] [address] or Organizations: Not on file    Attends Club or Organization Meetings: Not on file    Marital Status: Not on file   Intimate Partner Violence:     Fear of Current or Ex-Partner: Not on file    Emotionally Abused: Not on file    Physically Abused: Not on file    Sexually Abused: Not on file   Housing Stability:     Unable to Pay for Housing in the Last Year: Not on file    Number of [place] in the Last Year: Not on file    Unstable Housing in the Last Year: Not on file     Current Outpatient Medications on File Prior to Visit   Medication Sig Dispense Refill    atorvastatin (LIPITOR) 10 mg tablet TAKE ONE TABLET BY MOUTH ONE TIME DAILY 90 Tablet 0    lisinopriL (PRINIVIL, ZESTRIL) 20 mg tablet Take 1 Tablet by mouth daily. 30 Tablet 0    venlafaxine-SR (EFFEXOR-XR) 150 mg capsule Take 150 mg by mouth daily.  ALPRAZolam (XANAX) 0.25 mg tablet Take 0.25 mg by mouth nightly.  [DISCONTINUED] amLODIPine (NORVASC) 5 mg tablet TAKE ONE TABLET BY MOUTH ONE TIME DAILY 30 Tablet 0     No current facility-administered medications on file prior to visit. No Known Allergies  ROS  See HPI for pertinent ROS. Objective  Visit Vitals  BP (!) 144/92   Pulse 90   Temp 97.5 °F (36.4 °C) (Temporal)   Resp 16   Ht 6' 1\" (1.854 m)   Wt 183 lb 4 oz (83.1 kg)   SpO2 97%   BMI 24.18 kg/m²       Physical Exam  Vitals and nursing note reviewed. Constitutional:       General: He is not in acute distress. Appearance: Normal appearance. He is normal weight. HENT:      Head: Normocephalic. Eyes:      Extraocular Movements: Extraocular movements intact.    Cardiovascular:      Rate and Rhythm: Normal rate and regular rhythm. Heart sounds: Normal heart sounds. Pulmonary:      Effort: Pulmonary effort is normal.      Breath sounds: Normal breath sounds. Musculoskeletal:         General: Normal range of motion. Right lower leg: No edema. Left lower leg: No edema. Skin:     General: Skin is warm and dry. Neurological:      Mental Status: He is alert and oriented to person, place, and time. Psychiatric:         Mood and Affect: Mood normal.         Behavior: Behavior normal.          Assessment & Plan      ICD-10-CM ICD-9-CM    1. Essential hypertension  I10 401.9 amLODIPine (NORVASC) 10 mg tablet   2. Pure hypercholesterolemia  E78.00 272.0    3. BMI 24.0-24.9, adult  Z68.24 V85.1    4. Vaccine refused by patient  Z28.20 V64.09    5. Colon cancer screening declined  Z53.20 V64.2      Diagnoses and all orders for this visit:    1. Essential hypertension  BP is above goal in the office today on 2 readings. Increase amlodipine as ordered and continue lisinopril daily. Reinforced the importance of home BP monitoring. Check BP readings 1 to 2 hours after taking medication and after sitting quietly for 5 minutes with both feet flat on the floor and arm at heart level. Call if readings are consistently greater than 140/90 on either number. Increase regular exercise, limit salt intake, and stay well-hydrated. -     amLODIPine (NORVASC) 10 mg tablet; Take 1 Tablet by mouth daily. 2. Pure hypercholesterolemia  Continue atorvastatin daily. We will recheck lipids in 6 weeks when he has insurance again. 3. BMI 24.0-24.9, adult  Continue to stay active and eat a healthy diet. 4. Vaccine refused by patient  Declines vaccines until he has insurance. 5. Colon cancer screening declined  Declines colon cancer screening until he has insurance. Follow-up and Dispositions    · Return in about 6 weeks (around 8/2/2022) for follow up, hypertension, nonfasting.            Yasir Wagoner NP

## 2022-08-19 DIAGNOSIS — I10 ESSENTIAL HYPERTENSION: ICD-10-CM

## 2022-08-19 RX ORDER — AMLODIPINE BESYLATE 10 MG/1
TABLET ORAL
Qty: 30 TABLET | Refills: 1 | Status: SHIPPED | OUTPATIENT
Start: 2022-08-19 | End: 2022-09-07

## 2022-08-19 RX ORDER — LISINOPRIL 20 MG/1
TABLET ORAL
Qty: 30 TABLET | Refills: 1 | Status: SHIPPED | OUTPATIENT
Start: 2022-08-19 | End: 2022-09-07

## 2022-08-23 ENCOUNTER — TELEPHONE (OUTPATIENT)
Dept: FAMILY MEDICINE CLINIC | Age: 57
End: 2022-08-23

## 2022-08-23 ENCOUNTER — OFFICE VISIT (OUTPATIENT)
Dept: FAMILY MEDICINE CLINIC | Age: 57
End: 2022-08-23

## 2022-08-23 VITALS
DIASTOLIC BLOOD PRESSURE: 88 MMHG | HEART RATE: 90 BPM | SYSTOLIC BLOOD PRESSURE: 144 MMHG | WEIGHT: 188.13 LBS | HEIGHT: 73 IN | TEMPERATURE: 97.4 F | OXYGEN SATURATION: 98 % | BODY MASS INDEX: 24.93 KG/M2 | RESPIRATION RATE: 16 BRPM

## 2022-08-23 DIAGNOSIS — Z53.20 PROCEDURE REFUSED: ICD-10-CM

## 2022-08-23 DIAGNOSIS — I10 ESSENTIAL HYPERTENSION: Primary | ICD-10-CM

## 2022-08-23 DIAGNOSIS — Z53.20 COLON CANCER SCREENING DECLINED: ICD-10-CM

## 2022-08-23 DIAGNOSIS — Z13.31 POSITIVE DEPRESSION SCREENING: ICD-10-CM

## 2022-08-23 DIAGNOSIS — Z28.20 VACCINE REFUSED BY PATIENT: ICD-10-CM

## 2022-08-23 PROCEDURE — 99214 OFFICE O/P EST MOD 30 MIN: CPT | Performed by: NURSE PRACTITIONER

## 2022-08-23 RX ORDER — CHLORTHALIDONE 25 MG/1
12.5 TABLET ORAL DAILY
Qty: 15 TABLET | Refills: 1 | Status: SHIPPED | OUTPATIENT
Start: 2022-08-23 | End: 2022-10-14 | Stop reason: SDUPTHER

## 2022-08-23 NOTE — TELEPHONE ENCOUNTER
When patient went to pharmacy the medication Chlorthalidone the cost was $111 but pharmacist said if you increase to 25 mg it's $12.  Would you consider prescribing this and I can cut them in half?

## 2022-08-23 NOTE — PROGRESS NOTES
Subjective  Chief Complaint   Patient presents with    Follow-up     HTN     HPI:  Adrianna Falcon is a 62 y.o. male. Patient presents for management of hypertension. Management of HTN-  Current meds: Amlodipine 10 mg and lisinopril 20 mg daily-amlodipine increased to 10 mg daily at last appointment  Home BP readings: consistently >140/90  Denies lightheadedness, dizziness, headaches, chest pain, palpitations, and lower extremity edema.     Past Medical History:   Diagnosis Date    Anxiety     Depression     Essential hypertension      Family History   Problem Relation Age of Onset    Alzheimer's Disease Mother     Diabetes Father     Hypertension Father      Social History     Socioeconomic History    Marital status: SINGLE     Spouse name: Not on file    Number of children: Not on file    Years of education: Not on file    Highest education level: Not on file   Occupational History    Not on file   Tobacco Use    Smoking status: Every Day     Packs/day: 0.25     Years: 56.00     Pack years: 14.00     Types: Cigarettes    Smokeless tobacco: Never    Tobacco comments:     Started age 8- 1 pack every 3 days at max   Vaping Use    Vaping Use: Never used   Substance and Sexual Activity    Alcohol use: Not Currently    Drug use: Not Currently    Sexual activity: Not on file   Other Topics Concern    Not on file   Social History Narrative    Not on file     Social Determinants of Health     Financial Resource Strain: Not on file   Food Insecurity: Not on file   Transportation Needs: Not on file   Physical Activity: Not on file   Stress: Not on file   Social Connections: Not on file   Intimate Partner Violence: Not on file   Housing Stability: Not on file     Current Outpatient Medications on File Prior to Visit   Medication Sig Dispense Refill    lisinopriL (PRINIVIL, ZESTRIL) 20 mg tablet TAKE ONE TABLET BY MOUTH ONE TIME DAILY 30 Tablet 1    amLODIPine (NORVASC) 10 mg tablet TAKE ONE TABLET BY MOUTH ONE TIME DAILY 30 Tablet 1    atorvastatin (LIPITOR) 10 mg tablet TAKE ONE TABLET BY MOUTH ONE TIME DAILY 90 Tablet 0    venlafaxine-SR (EFFEXOR-XR) 150 mg capsule Take 150 mg by mouth daily. ALPRAZolam (XANAX) 0.25 mg tablet Take 0.25 mg by mouth nightly. No current facility-administered medications on file prior to visit. No Known Allergies      Objective  Visit Vitals  BP (!) 144/88 (BP 1 Location: Left upper arm, BP Patient Position: Sitting, BP Cuff Size: Adult)   Pulse 90   Temp 97.4 °F (36.3 °C) (Temporal)   Resp 16   Ht 6' 1\" (1.854 m)   Wt 188 lb 2 oz (85.3 kg)   SpO2 98%   BMI 24.82 kg/m²       Physical Exam  Vitals and nursing note reviewed. Constitutional:       General: He is not in acute distress. Appearance: Normal appearance. He is normal weight. HENT:      Head: Normocephalic. Eyes:      Extraocular Movements: Extraocular movements intact. Cardiovascular:      Rate and Rhythm: Normal rate and regular rhythm. Heart sounds: Normal heart sounds. Pulmonary:      Effort: Pulmonary effort is normal.      Breath sounds: Normal breath sounds. Musculoskeletal:         General: Normal range of motion. Right lower leg: No edema. Left lower leg: No edema. Skin:     General: Skin is warm and dry. Neurological:      Mental Status: He is alert and oriented to person, place, and time. Psychiatric:         Mood and Affect: Mood normal.         Behavior: Behavior normal.        Assessment & Plan      ICD-10-CM ICD-9-CM    1. Essential hypertension  I10 401.9 chlorthalidone (THALITONE) 15 mg tablet      2. Positive depression screening  Z13.31 796.4       3. BMI 24.0-24.9, adult  Z68.24 V85.1       4. Vaccine refused by patient  Z28.20 V64.09       5. Colon cancer screening declined  Z53.20 V64.2       6. Procedure refused  Z53.20 V64.2         Diagnoses and all orders for this visit:    1.  Essential hypertension  BP is above goal in the office today and on reported home readings. Start chlorthalidone as ordered. Continue amlodipine and lisinopril daily. Check BP readings 1 to 2 hours after taking medication and after sitting quietly for 5 minutes with both feet flat on the floor and arm at heart level. Call if readings are consistently greater than 140/90 on either number. Bring BP cuff and/or readings to next visit. -     chlorthalidone (THALITONE) 15 mg tablet; Take 1 Tablet by mouth daily. 2. Positive depression screening  Declines evaluation. Follows with psychiatry for management. Attributes findings to frustration while living with cousin. 3. BMI 24.0-24.9, adult  Weight is up approximately 5 pounds since previous visit. 4. Vaccine refused by patient  Declines all vaccines until he has insurance. 5. Colon cancer screening declined  Declines colon cancer screening until he has insurance. 6. Procedure refused  Declines labs until he has insurance. Aspects of this note may have been generated using voice recognition software. Despite editing, there may be some syntax errors. Follow-up and Dispositions    Return in about 6 weeks (around 10/4/2022) for follow up, hypertension, nonfasting. I have discussed the diagnosis with the patient and the intended plan as seen in the above orders. The patient has received an after-visit summary and questions were answered concerning future plans. I have discussed medication side effects and warnings with the patient as well.     Melissa Marroquin NP

## 2022-08-23 NOTE — PROGRESS NOTES
Chief Complaint   Patient presents with    Follow-up     HTN    1. \"Have you been to the ER, urgent care clinic since your last visit? Hospitalized since your last visit? \" No    2. \"Have you seen or consulted any other health care providers outside of the [address] [place] [address] since your last visit? \" No     3. For patients aged 39-70: Has the patient had a colonoscopy / FIT/ Cologuard? No      If the patient is female:    4. For patients aged 41-77: Has the patient had a mammogram within the past 2 years? NA - based on age or sex      11. For patients aged 21-65: Has the patient had a pap smear?  NA - based on age or sex Visit Vitals  BP (!) 144/88 (BP 1 Location: Left upper arm, BP Patient Position: Sitting, BP Cuff Size: Adult)   Pulse 90   Temp 97.4 °F (36.3 °C) (Temporal)   Resp 16   Ht 6' 1\" (1.854 m)   Wt 188 lb 2 oz (85.3 kg)   SpO2 98%   BMI 24.82 kg/m²      3 most recent PHQ Screens 8/23/2022   Little interest or pleasure in doing things More than half the days   Feeling down, depressed, irritable, or hopeless More than half the days   Total Score PHQ 2 4   Trouble falling or staying asleep, or sleeping too much Not at all   Feeling tired or having little energy More than half the days   Poor appetite, weight loss, or overeating Not at all   Feeling bad about yourself - or that you are a failure or have let yourself or your family down Nearly every day   Trouble concentrating on things such as school, work, reading, or watching TV Not at all   Moving or speaking so slowly that other people could have noticed; or the opposite being so fidgety that others notice Not at all   Thoughts of being better off dead, or hurting yourself in some way Several days   PHQ 9 Score 10   How difficult have these problems made it for you to do your work, take care of your home and get along with others Not difficult at all

## 2022-09-07 DIAGNOSIS — I10 ESSENTIAL HYPERTENSION: ICD-10-CM

## 2022-09-07 RX ORDER — LISINOPRIL 20 MG/1
TABLET ORAL
Qty: 30 TABLET | Refills: 1 | Status: SHIPPED | OUTPATIENT
Start: 2022-09-07 | End: 2022-10-14 | Stop reason: SINTOL

## 2022-09-07 RX ORDER — AMLODIPINE BESYLATE 10 MG/1
TABLET ORAL
Qty: 30 TABLET | Refills: 1 | Status: SHIPPED | OUTPATIENT
Start: 2022-09-07

## 2022-09-07 RX ORDER — ATORVASTATIN CALCIUM 10 MG/1
TABLET, FILM COATED ORAL
Qty: 90 TABLET | Refills: 0 | Status: SHIPPED | OUTPATIENT
Start: 2022-09-07

## 2022-10-14 ENCOUNTER — OFFICE VISIT (OUTPATIENT)
Dept: FAMILY MEDICINE CLINIC | Age: 57
End: 2022-10-14

## 2022-10-14 VITALS
HEIGHT: 73 IN | HEART RATE: 99 BPM | BODY MASS INDEX: 25.58 KG/M2 | SYSTOLIC BLOOD PRESSURE: 150 MMHG | OXYGEN SATURATION: 99 % | DIASTOLIC BLOOD PRESSURE: 82 MMHG | TEMPERATURE: 97.3 F | WEIGHT: 193 LBS

## 2022-10-14 DIAGNOSIS — R60.0 LOWER EXTREMITY EDEMA: ICD-10-CM

## 2022-10-14 DIAGNOSIS — R00.2 PALPITATIONS: ICD-10-CM

## 2022-10-14 DIAGNOSIS — I10 ESSENTIAL HYPERTENSION: Primary | ICD-10-CM

## 2022-10-14 DIAGNOSIS — R42 LIGHTHEADEDNESS: ICD-10-CM

## 2022-10-14 DIAGNOSIS — R05.9 COUGH, UNSPECIFIED TYPE: ICD-10-CM

## 2022-10-14 DIAGNOSIS — F10.90 ALCOHOL INTAKE ABOVE RECOMMENDED SENSIBLE LIMITS: ICD-10-CM

## 2022-10-14 DIAGNOSIS — R63.5 WEIGHT GAIN: ICD-10-CM

## 2022-10-14 DIAGNOSIS — Z53.20 PROCEDURE REFUSED: ICD-10-CM

## 2022-10-14 PROCEDURE — 99214 OFFICE O/P EST MOD 30 MIN: CPT | Performed by: NURSE PRACTITIONER

## 2022-10-14 RX ORDER — CHLORTHALIDONE 25 MG/1
12.5 TABLET ORAL DAILY
Qty: 15 TABLET | Refills: 1 | Status: SHIPPED | OUTPATIENT
Start: 2022-10-14

## 2022-10-14 RX ORDER — LOSARTAN POTASSIUM 50 MG/1
50 TABLET ORAL DAILY
Qty: 90 TABLET | Refills: 0 | Status: SHIPPED | OUTPATIENT
Start: 2022-10-14

## 2022-10-14 NOTE — PROGRESS NOTES
Chief Complaint   Patient presents with    Swelling     Feet and ankles    Cough     And gagging     1. \"Have you been to the ER, urgent care clinic since your last visit? Hospitalized since your last visit? \" No    2. \"Have you seen or consulted any other health care providers outside of the [address] [place] [address] since your last visit? \" No     3. For patients aged 39-70: Has the patient had a colonoscopy / FIT/ Cologuard? No      If the patient is female:    4. For patients aged 41-77: Has the patient had a mammogram within the past 2 years? NA - based on age or sex      11. For patients aged 21-65: Has the patient had a pap smear?  NA - based on age or sex    3 most recent PHQ Screens 10/14/2022   Little interest or pleasure in doing things Several days   Feeling down, depressed, irritable, or hopeless Several days   Total Score PHQ 2 2   Trouble falling or staying asleep, or sleeping too much -   Feeling tired or having little energy -   Poor appetite, weight loss, or overeating -   Feeling bad about yourself - or that you are a failure or have let yourself or your family down -   Trouble concentrating on things such as school, work, reading, or watching TV -   Moving or speaking so slowly that other people could have noticed; or the opposite being so fidgety that others notice -   Thoughts of being better off dead, or hurting yourself in some way -   PHQ 9 Score -   How difficult have these problems made it for you to do your work, take care of your home and get along with others -

## 2022-10-14 NOTE — PROGRESS NOTES
Subjective  Chief Complaint   Patient presents with    Swelling     Feet and ankles    Cough     And gagging     HPI:  Hans Frias is a 62 y.o. male. Patient presents with complaint of bilateral ankle edema Monday that is now resolved. Also c/o excessive cough that causes him to gag. He stopped taking Chlorthalidone when the prescription ran out. Home BP readings reviewed via cuff, consistently >140/90 on one or both readings. He also admits to excessive alcohol intake over the past few weeks. He currently is without insurance and declines labs, EKG, and CXR today. denies SOB, CP, headaches, and dizziness. Reports palpitations which is attributed to stress. Reports lightheadedness.       Past Medical History:   Diagnosis Date    Anxiety     Depression     Essential hypertension      Family History   Problem Relation Age of Onset    Alzheimer's Disease Mother     Diabetes Father     Hypertension Father      Social History     Socioeconomic History    Marital status: SINGLE     Spouse name: Not on file    Number of children: Not on file    Years of education: Not on file    Highest education level: Not on file   Occupational History    Not on file   Tobacco Use    Smoking status: Every Day     Packs/day: 0.25     Years: 56.00     Pack years: 14.00     Types: Cigarettes    Smokeless tobacco: Never    Tobacco comments:     Started age 8- 1 pack every 3 days at max   Vaping Use    Vaping Use: Never used   Substance and Sexual Activity    Alcohol use: Not Currently    Drug use: Not Currently    Sexual activity: Not on file   Other Topics Concern    Not on file   Social History Narrative    Not on file     Social Determinants of Health     Financial Resource Strain: Low Risk     Difficulty of Paying Living Expenses: Not hard at all   Food Insecurity: No Food Insecurity    Worried About Running Out of Food in the Last Year: Never true    Ran Out of Food in the Last Year: Never true   Transportation Needs: Not on file   Physical Activity: Not on file   Stress: Not on file   Social Connections: Not on file   Intimate Partner Violence: Not on file   Housing Stability: Not on file     Current Outpatient Medications on File Prior to Visit   Medication Sig Dispense Refill    amLODIPine (NORVASC) 10 mg tablet TAKE ONE TABLET BY MOUTH ONE TIME DAILY 30 Tablet 1    atorvastatin (LIPITOR) 10 mg tablet TAKE ONE TABLET BY MOUTH ONE TIME DAILY 90 Tablet 0    venlafaxine-SR (EFFEXOR-XR) 150 mg capsule Take 150 mg by mouth daily. ALPRAZolam (XANAX) 0.25 mg tablet Take 0.25 mg by mouth nightly. [DISCONTINUED] lisinopriL (PRINIVIL, ZESTRIL) 20 mg tablet TAKE ONE TABLET BY MOUTH ONE TIME DAILY 30 Tablet 1    [DISCONTINUED] chlorthalidone (HYGROTON) 25 mg tablet Take 0.5 Tablets by mouth daily. (Patient not taking: Reported on 10/14/2022) 15 Tablet 1     No current facility-administered medications on file prior to visit. No Known Allergies      Objective  Visit Vitals  BP (!) 150/82 (BP 1 Location: Left upper arm, BP Patient Position: Sitting)   Pulse 99   Temp 97.3 °F (36.3 °C) (Temporal)   Ht 6' 1\" (1.854 m)   Wt 193 lb (87.5 kg)   SpO2 99%   BMI 25.46 kg/m²       Physical Exam  Vitals and nursing note reviewed. Constitutional:       General: He is not in acute distress. Appearance: Normal appearance. He is overweight. HENT:      Head: Normocephalic. Eyes:      Extraocular Movements: Extraocular movements intact. Cardiovascular:      Rate and Rhythm: Normal rate and regular rhythm. Heart sounds: Normal heart sounds. Pulmonary:      Effort: Pulmonary effort is normal.      Breath sounds: Normal breath sounds. Musculoskeletal:         General: Normal range of motion. Right lower leg: No edema. Left lower leg: No edema. Skin:     General: Skin is warm and dry. Neurological:      Mental Status: He is alert and oriented to person, place, and time.    Psychiatric:         Mood and Affect: Mood normal.         Behavior: Behavior normal.        Assessment & Plan      ICD-10-CM ICD-9-CM    1. Essential hypertension  I10 401.9 chlorthalidone (HYGROTON) 25 mg tablet      losartan (COZAAR) 50 mg tablet      2. Lower extremity edema  R60.0 782.3       3. Cough, unspecified type  R05.9 786.2       4. Palpitations  R00.2 785.1       5. Lightheadedness  R42 780.4       6. Weight gain  R63.5 783.1       7. Alcohol intake above recommended sensible limits  F10.90 305.00       8. Procedure refused  Z53.20 V64.2         Diagnoses and all orders for this visit:    1. Essential hypertension  BP is above goal in the office today and on home BP cuff readings. Uncontrolled BP may be contributing to edema, palpitations, and lightheadedness. Restart chlorthalidone as ordered. Continue to monitor blood pressure closely and call if consistently greater than 140/90 on either number over the next 1 to 2 weeks. -     chlorthalidone (HYGROTON) 25 mg tablet; Take 0.5 Tablets by mouth daily. -     losartan (COZAAR) 50 mg tablet; Take 1 Tablet by mouth daily. 2. Lower extremity edema  No edema on exam today. Declines labs today including BMP and BNP. Restart chlorthalidone as ordered. 3. Cough, unspecified type  Lungs are clear bilaterally with no crackles to bilateral bases. O2 sats are above expected. No cough observed during visit. Recommend chest x-ray and BNP which he declines due to lack of insurance. We will have him stop lisinopril which is known to cause a slight dry cough and start losartan as ordered. Also consider daily long acting antihistamine for possible allergies. 4. Palpitations  May be due to elevated blood pressure, stress/anxiety, or other etiology. He declines EKG and labs including CBC, BMP, TSH for further evaluation. 5. Lightheadedness  May be due to elevated blood pressure or other etiology. He declines EKG and labs including CBC, BMP, TSH for further evaluation.      6. Weight gain  Weight is up 5 pounds since his previous visit. There are several etiologies for this including high alcohol intake or fluid overload. Unable to identify etiology without additional labs and imaging which he declines today. 7. Alcohol intake above recommended sensible limits  Advised to limit alcohol intake to no more than 2 drinks per day and avoid binge drinking at all times. High alcohol intake may be contributing to weight gain, palpitations, lightheadedness, and lower extremity edema. 8. Procedure refused  Declines labs, chest x-ray, and EKG. He verbalized understanding of the risk. He also verbalized understanding to seek care at ED for worsening of symptoms or any acute changes or concerns. Aspects of this note may have been generated using voice recognition software. Despite editing, there may be some syntax errors. Follow-up and Dispositions    Return in about 6 weeks (around 11/25/2022) for follow up from 10/14/2022. I have discussed the diagnosis with the patient and the intended plan as seen in the above orders. The patient has received an after-visit summary and questions were answered concerning future plans. I have discussed medication side effects and warnings with the patient as well.     John Pond NP

## 2023-01-15 DIAGNOSIS — I10 ESSENTIAL HYPERTENSION: ICD-10-CM

## 2023-01-16 RX ORDER — LOSARTAN POTASSIUM 50 MG/1
TABLET ORAL
Qty: 90 TABLET | Refills: 0 | Status: SHIPPED | OUTPATIENT
Start: 2023-01-16

## 2023-01-16 RX ORDER — AMLODIPINE BESYLATE 10 MG/1
TABLET ORAL
Qty: 30 TABLET | Refills: 1 | Status: SHIPPED | OUTPATIENT
Start: 2023-01-16

## 2023-01-16 RX ORDER — ATORVASTATIN CALCIUM 10 MG/1
TABLET, FILM COATED ORAL
Qty: 90 TABLET | Refills: 0 | Status: SHIPPED | OUTPATIENT
Start: 2023-01-16

## 2023-01-16 RX ORDER — CHLORTHALIDONE 25 MG/1
TABLET ORAL
Qty: 15 TABLET | Refills: 1 | Status: SHIPPED | OUTPATIENT
Start: 2023-01-16

## 2023-05-12 RX ORDER — ATORVASTATIN CALCIUM 10 MG/1
10 TABLET, FILM COATED ORAL DAILY
COMMUNITY
Start: 2023-03-30 | End: 2023-05-12 | Stop reason: SDUPTHER

## 2023-05-12 RX ORDER — LOSARTAN POTASSIUM 50 MG/1
50 TABLET ORAL DAILY
Qty: 90 TABLET | Refills: 0 | Status: SHIPPED | OUTPATIENT
Start: 2023-05-12

## 2023-05-12 RX ORDER — CHLORTHALIDONE 25 MG/1
TABLET ORAL
Qty: 90 TABLET | Refills: 0 | Status: SHIPPED | OUTPATIENT
Start: 2023-05-12

## 2023-05-12 RX ORDER — LOSARTAN POTASSIUM 50 MG/1
50 TABLET ORAL DAILY
COMMUNITY
Start: 2023-03-30 | End: 2023-05-12 | Stop reason: SDUPTHER

## 2023-05-12 RX ORDER — CHLORTHALIDONE 25 MG/1
TABLET ORAL
COMMUNITY
Start: 2023-03-30 | End: 2023-05-12 | Stop reason: SDUPTHER

## 2023-05-12 RX ORDER — AMLODIPINE BESYLATE 10 MG/1
10 TABLET ORAL DAILY
COMMUNITY
Start: 2023-03-30 | End: 2023-05-12 | Stop reason: SDUPTHER

## 2023-05-12 RX ORDER — AMLODIPINE BESYLATE 10 MG/1
10 TABLET ORAL DAILY
Qty: 90 TABLET | Refills: 0 | Status: SHIPPED | OUTPATIENT
Start: 2023-05-12

## 2023-05-12 RX ORDER — ATORVASTATIN CALCIUM 10 MG/1
10 TABLET, FILM COATED ORAL DAILY
Qty: 90 TABLET | Refills: 0 | Status: SHIPPED | OUTPATIENT
Start: 2023-05-12

## 2023-05-17 RX ORDER — VENLAFAXINE HYDROCHLORIDE 150 MG/1
150 CAPSULE, EXTENDED RELEASE ORAL DAILY
COMMUNITY

## 2023-05-17 RX ORDER — ALPRAZOLAM 0.25 MG/1
0.25 TABLET ORAL
COMMUNITY